# Patient Record
Sex: FEMALE | Race: WHITE | NOT HISPANIC OR LATINO
[De-identification: names, ages, dates, MRNs, and addresses within clinical notes are randomized per-mention and may not be internally consistent; named-entity substitution may affect disease eponyms.]

---

## 2018-01-26 ENCOUNTER — TRANSCRIPTION ENCOUNTER (OUTPATIENT)
Age: 83
End: 2018-01-26

## 2019-05-27 ENCOUNTER — EMERGENCY (EMERGENCY)
Facility: HOSPITAL | Age: 84
LOS: 1 days | Discharge: HOME | End: 2019-05-27
Attending: EMERGENCY MEDICINE | Admitting: EMERGENCY MEDICINE
Payer: MEDICARE

## 2019-05-27 VITALS
HEART RATE: 64 BPM | OXYGEN SATURATION: 95 % | SYSTOLIC BLOOD PRESSURE: 153 MMHG | WEIGHT: 134.92 LBS | DIASTOLIC BLOOD PRESSURE: 76 MMHG | RESPIRATION RATE: 16 BRPM | HEIGHT: 61 IN | TEMPERATURE: 99 F

## 2019-05-27 VITALS
OXYGEN SATURATION: 96 % | RESPIRATION RATE: 16 BRPM | TEMPERATURE: 99 F | HEART RATE: 63 BPM | DIASTOLIC BLOOD PRESSURE: 82 MMHG | SYSTOLIC BLOOD PRESSURE: 166 MMHG

## 2019-05-27 PROBLEM — Z00.00 ENCOUNTER FOR PREVENTIVE HEALTH EXAMINATION: Status: ACTIVE | Noted: 2019-05-27

## 2019-05-27 PROCEDURE — 90715 TDAP VACCINE 7 YRS/> IM: CPT

## 2019-05-27 PROCEDURE — 73562 X-RAY EXAM OF KNEE 3: CPT | Mod: 26,RT

## 2019-05-27 PROCEDURE — 99285 EMERGENCY DEPT VISIT HI MDM: CPT | Mod: 25

## 2019-05-27 PROCEDURE — 73130 X-RAY EXAM OF HAND: CPT | Mod: 26,LT

## 2019-05-27 PROCEDURE — 70450 CT HEAD/BRAIN W/O DYE: CPT | Mod: 26

## 2019-05-27 PROCEDURE — 71101 X-RAY EXAM UNILAT RIBS/CHEST: CPT

## 2019-05-27 PROCEDURE — 70450 CT HEAD/BRAIN W/O DYE: CPT

## 2019-05-27 PROCEDURE — 90471 IMMUNIZATION ADMIN: CPT

## 2019-05-27 PROCEDURE — 71101 X-RAY EXAM UNILAT RIBS/CHEST: CPT | Mod: 26

## 2019-05-27 PROCEDURE — 99284 EMERGENCY DEPT VISIT MOD MDM: CPT | Mod: 25

## 2019-05-27 PROCEDURE — 12013 RPR F/E/E/N/L/M 2.6-5.0 CM: CPT

## 2019-05-27 PROCEDURE — 73130 X-RAY EXAM OF HAND: CPT

## 2019-05-27 PROCEDURE — 73562 X-RAY EXAM OF KNEE 3: CPT

## 2019-05-27 RX ORDER — ACETAMINOPHEN 500 MG
650 TABLET ORAL ONCE
Refills: 0 | Status: COMPLETED | OUTPATIENT
Start: 2019-05-27 | End: 2019-05-27

## 2019-05-27 RX ORDER — TETANUS AND DIPHTHERIA TOXOIDS ADSORBED 2; 2 [LF]/.5ML; [LF]/.5ML
0.5 INJECTION INTRAMUSCULAR ONCE
Refills: 0 | Status: DISCONTINUED | OUTPATIENT
Start: 2019-05-27 | End: 2019-05-27

## 2019-05-27 RX ORDER — TETANUS TOXOID, REDUCED DIPHTHERIA TOXOID AND ACELLULAR PERTUSSIS VACCINE, ADSORBED 5; 2.5; 8; 8; 2.5 [IU]/.5ML; [IU]/.5ML; UG/.5ML; UG/.5ML; UG/.5ML
0.5 SUSPENSION INTRAMUSCULAR ONCE
Refills: 0 | Status: COMPLETED | OUTPATIENT
Start: 2019-05-27 | End: 2019-05-27

## 2019-05-27 RX ADMIN — Medication 650 MILLIGRAM(S): at 19:54

## 2019-05-27 RX ADMIN — Medication 650 MILLIGRAM(S): at 19:26

## 2019-05-27 RX ADMIN — TETANUS TOXOID, REDUCED DIPHTHERIA TOXOID AND ACELLULAR PERTUSSIS VACCINE, ADSORBED 0.5 MILLILITER(S): 5; 2.5; 8; 8; 2.5 SUSPENSION INTRAMUSCULAR at 18:31

## 2019-05-27 NOTE — ED ADULT TRIAGE NOTE - CHIEF COMPLAINT QUOTE
Per son the patient fell going up a small step and sustained a laceration to the left forehead from her glasses." Per son the patient fell going up a small step and sustained a laceration to the left forehead from her glasses and she also has pain to the left hand."

## 2019-05-27 NOTE — ED PROVIDER NOTE - ENMT, MLM
Airway patent, Nasal mucosa clear. Mouth with normal mucosa. Throat has no vesicles, no oropharyngeal exudates and uvula is midline.  3 cm laceration left eyebrow, no bony deformity,, PERRL, EOMI, neck supple, nontender,

## 2019-05-27 NOTE — ED PROVIDER NOTE - PROGRESS NOTE DETAILS
discussed results with family and pt, understand official xr reads will be done in the morning, pt is ambulatory, ready for discharge

## 2019-05-27 NOTE — ED ADULT NURSE NOTE - NSIMPLEMENTINTERV_GEN_ALL_ED
Implemented All Fall Risk Interventions:  [place] to call system. Call bell, personal items and telephone within reach. Instruct patient to call for assistance. Room bathroom lighting operational. Non-slip footwear when patient is off stretcher. Physically safe environment: no spills, clutter or unnecessary equipment. Stretcher in lowest position, wheels locked, appropriate side rails in place. Provide visual cue, wrist band, yellow gown, etc. Monitor gait and stability. Monitor for mental status changes and reorient to person, place, and time. Review medications for side effects contributing to fall risk. Reinforce activity limits and safety measures with patient and family.

## 2019-05-27 NOTE — ED ADULT NURSE NOTE - OBJECTIVE STATEMENT
patient fell and hit her head, a laceration on left eyebrow and bleeding stopped, also c/o left wrist pain, MD at bedside, denies chest pain or patient fell and hit her head, a laceration on left eyebrow and bleeding stopped, also c/o left wrist pain, MD at bedside, denies chest pain or sob or headache, will continue to monitor.   Pt speaks [place], Talib Solorio, Son is translating for patient

## 2019-05-27 NOTE — ED ADULT NURSE NOTE - CHIEF COMPLAINT QUOTE
Per son the patient fell going up a small step and sustained a laceration to the left forehead from her glasses and she also has pain to the left hand."

## 2019-05-27 NOTE — ED PROVIDER NOTE - CLINICAL SUMMARY MEDICAL DECISION MAKING FREE TEXT BOX
dementia pt fell left eyebrow laceration, multiple bony tenderness, plan - suture, CT brain, XR ribs, hand, knee.

## 2019-05-27 NOTE — ED PROVIDER NOTE - CARDIAC, MLM
Normal rate, regular rhythm.  Heart sounds S1, S2.  tenderness left lateral ribcage, no boy deformity, no stepoff

## 2019-05-27 NOTE — ED PROVIDER NOTE - MUSCULOSKELETAL, MLM
Spine appears normal, range of motion is not limited, no muscle or joint tenderness,+ minor swelling and tenderness left hand, no bony deformity, FROM intact, +minor tenderness right knee no bony deformity, FROM intact

## 2020-11-02 ENCOUNTER — EMERGENCY (EMERGENCY)
Facility: HOSPITAL | Age: 85
LOS: 1 days | End: 2020-11-02
Attending: EMERGENCY MEDICINE | Admitting: EMERGENCY MEDICINE
Payer: MEDICARE

## 2020-11-02 VITALS
DIASTOLIC BLOOD PRESSURE: 77 MMHG | SYSTOLIC BLOOD PRESSURE: 168 MMHG | HEART RATE: 92 BPM | RESPIRATION RATE: 18 BRPM | TEMPERATURE: 98 F | OXYGEN SATURATION: 95 %

## 2020-11-02 VITALS
TEMPERATURE: 98 F | HEIGHT: 61 IN | SYSTOLIC BLOOD PRESSURE: 146 MMHG | RESPIRATION RATE: 18 BRPM | OXYGEN SATURATION: 96 % | WEIGHT: 130.07 LBS | HEART RATE: 78 BPM | DIASTOLIC BLOOD PRESSURE: 74 MMHG

## 2020-11-02 PROCEDURE — 99284 EMERGENCY DEPT VISIT MOD MDM: CPT

## 2020-11-02 PROCEDURE — 73030 X-RAY EXAM OF SHOULDER: CPT

## 2020-11-02 PROCEDURE — 70450 CT HEAD/BRAIN W/O DYE: CPT | Mod: 26

## 2020-11-02 PROCEDURE — 73060 X-RAY EXAM OF HUMERUS: CPT | Mod: 26,LT

## 2020-11-02 PROCEDURE — 70450 CT HEAD/BRAIN W/O DYE: CPT

## 2020-11-02 PROCEDURE — 73030 X-RAY EXAM OF SHOULDER: CPT | Mod: 26,LT

## 2020-11-02 PROCEDURE — 73060 X-RAY EXAM OF HUMERUS: CPT

## 2020-11-02 PROCEDURE — 72125 CT NECK SPINE W/O DYE: CPT

## 2020-11-02 PROCEDURE — 72125 CT NECK SPINE W/O DYE: CPT | Mod: 26

## 2020-11-02 PROCEDURE — 99284 EMERGENCY DEPT VISIT MOD MDM: CPT | Mod: 25

## 2020-11-02 RX ORDER — OXYCODONE AND ACETAMINOPHEN 5; 325 MG/1; MG/1
1 TABLET ORAL ONCE
Refills: 0 | Status: DISCONTINUED | OUTPATIENT
Start: 2020-11-02 | End: 2020-11-02

## 2020-11-02 RX ORDER — METOPROLOL TARTRATE 50 MG
50 TABLET ORAL
Qty: 0 | Refills: 0 | DISCHARGE

## 2020-11-02 RX ADMIN — OXYCODONE AND ACETAMINOPHEN 1 TABLET(S): 5; 325 TABLET ORAL at 11:44

## 2020-11-02 RX ADMIN — OXYCODONE AND ACETAMINOPHEN 1 TABLET(S): 5; 325 TABLET ORAL at 12:44

## 2020-11-02 NOTE — ED PROVIDER NOTE - OBJECTIVE STATEMENT
#[id number]  pt bib son for left shoulder pain s/p slip and fall onto left side this am. pt's son came to house helped her up, and she was able to bear weight on legs. no loc, ha, neck or back pain, weakness, numbness, cp, sob, abd pain, leg pain.  pmd - delchandrika

## 2020-11-02 NOTE — ED ADULT NURSE NOTE - OBJECTIVE STATEMENT
Amb to ED with her son who states when he arrived @ her house he found her facedown on floor. Pt speaks Farsi- used  Tanya # [id number]. Pt states she slipped & fell on her left shoulder. C/O tenderness over left shoulder-unable to raise her arm. Pulses + Denies any other symtoms- no dizziness or headache. Freely moving bilat legs & rt arm.  Bed alarm placed on bed

## 2020-11-02 NOTE — ED ADULT TRIAGE NOTE - CHIEF COMPLAINT QUOTE
" I found her face down on the floor " I found her face down on the floor, her left shoulder hurts, she is on Eliquis "

## 2020-11-02 NOTE — ED PROVIDER NOTE - CARE PROVIDER_API CALL
Yousif Oliveros  ORTHOPAEDIC SPORTS MEDICINE  [address], [address]  [place] [address]  Phone: [phone]  Fax: [phone]  Follow Up Time: 1-3 Days

## 2020-11-02 NOTE — ED PROVIDER NOTE - PATIENT PORTAL LINK FT
You can access the FollowMyHealth Patient Portal offered by [facility] by registering at the following website: [email or web address]. By joining [employer]’s FollowMyHealth portal, you will also be able to view your health information using other applications (apps) compatible with our system.

## 2021-01-22 ENCOUNTER — EMERGENCY (EMERGENCY)
Facility: HOSPITAL | Age: 86
LOS: 1 days | Discharge: HOME | End: 2021-01-22
Attending: EMERGENCY MEDICINE | Admitting: EMERGENCY MEDICINE
Payer: MEDICARE

## 2021-01-22 VITALS
SYSTOLIC BLOOD PRESSURE: 183 MMHG | HEART RATE: 75 BPM | DIASTOLIC BLOOD PRESSURE: 94 MMHG | TEMPERATURE: 98 F | RESPIRATION RATE: 18 BRPM | OXYGEN SATURATION: 95 %

## 2021-01-22 VITALS
RESPIRATION RATE: 18 BRPM | WEIGHT: 134.92 LBS | HEART RATE: 72 BPM | TEMPERATURE: 98 F | HEIGHT: 61 IN | DIASTOLIC BLOOD PRESSURE: 101 MMHG | SYSTOLIC BLOOD PRESSURE: 179 MMHG | OXYGEN SATURATION: 95 %

## 2021-01-22 PROBLEM — I48.91 UNSPECIFIED ATRIAL FIBRILLATION: Chronic | Status: ACTIVE | Noted: 2020-11-02

## 2021-01-22 PROCEDURE — 73030 X-RAY EXAM OF SHOULDER: CPT

## 2021-01-22 PROCEDURE — 72125 CT NECK SPINE W/O DYE: CPT

## 2021-01-22 PROCEDURE — 70450 CT HEAD/BRAIN W/O DYE: CPT

## 2021-01-22 PROCEDURE — 73562 X-RAY EXAM OF KNEE 3: CPT | Mod: 26,RT

## 2021-01-22 PROCEDURE — 99284 EMERGENCY DEPT VISIT MOD MDM: CPT | Mod: 25

## 2021-01-22 PROCEDURE — 73030 X-RAY EXAM OF SHOULDER: CPT | Mod: 26,LT

## 2021-01-22 PROCEDURE — 70450 CT HEAD/BRAIN W/O DYE: CPT | Mod: 26,MH

## 2021-01-22 PROCEDURE — 72125 CT NECK SPINE W/O DYE: CPT | Mod: 26,MH

## 2021-01-22 PROCEDURE — 99284 EMERGENCY DEPT VISIT MOD MDM: CPT

## 2021-01-22 PROCEDURE — 73562 X-RAY EXAM OF KNEE 3: CPT

## 2021-01-22 RX ORDER — ARIPIPRAZOLE 15 MG/1
7 TABLET ORAL
Qty: 0 | Refills: 0 | DISCHARGE

## 2021-01-22 RX ORDER — MULTIVIT-MIN/FERROUS GLUCONATE 9 MG/15 ML
1 LIQUID (ML) ORAL
Qty: 0 | Refills: 0 | DISCHARGE

## 2021-01-22 RX ORDER — METOPROLOL TARTRATE 50 MG
1 TABLET ORAL
Qty: 0 | Refills: 0 | DISCHARGE

## 2021-01-22 RX ORDER — EXEMESTANE 25 MG/1
1 TABLET, SUGAR COATED ORAL
Qty: 0 | Refills: 0 | DISCHARGE

## 2021-01-22 RX ORDER — ALENDRONATE SODIUM 70 MG/1
1 TABLET ORAL
Qty: 0 | Refills: 0 | DISCHARGE

## 2021-01-22 RX ORDER — OXYBUTYNIN CHLORIDE 5 MG
1 TABLET ORAL
Qty: 0 | Refills: 0 | DISCHARGE

## 2021-01-22 RX ORDER — ATORVASTATIN CALCIUM 80 MG/1
1 TABLET, FILM COATED ORAL
Qty: 0 | Refills: 0 | DISCHARGE

## 2021-01-22 RX ORDER — SERTRALINE 25 MG/1
125 TABLET, FILM COATED ORAL
Qty: 0 | Refills: 0 | DISCHARGE

## 2021-01-22 RX ORDER — APIXABAN 2.5 MG/1
1 TABLET, FILM COATED ORAL
Qty: 0 | Refills: 0 | DISCHARGE

## 2021-01-22 RX ORDER — OMEPRAZOLE 10 MG/1
1 CAPSULE, DELAYED RELEASE ORAL
Qty: 0 | Refills: 0 | DISCHARGE

## 2021-01-22 NOTE — ED ADULT NURSE NOTE - NEURO ASSESSMENT
Anesthesia Start and Stop Event Times     Date Time Event    8/5/2019 0842 Ready for Procedure     0900 Anesthesia Start     1058 Anesthesia Stop        Responsible Staff  08/05/19    Name Role Begin End    Timothy Gibson M.D. Anesth 0900 1058        Preop Diagnosis (Free Text):  Pre-op Diagnosis     Left Hip Avascular Necrosis with Prior Surgery        Preop Diagnosis (Codes):    Post op Diagnosis  Avascular necrosis of hip, left (HCC)      Premium Reason  Non-Premium    Comments:                                                                       
- - -

## 2021-01-22 NOTE — ED ADULT NURSE NOTE - OBJECTIVE STATEMENT
as per  #[id number] Gina; pt is not answering questions correctly; pt will not communicate with . as per pt's son pt has dementia. pt winces and moans in pain when her left arm is touched. as per son; she seems to have more pain. pt s/p fall; swelling noted above left eye. as per pt's son; he heard patient get OOB, then heard her fall

## 2021-01-22 NOTE — ED ADULT NURSE NOTE - NSIMPLEMENTINTERV_GEN_ALL_ED
Implemented All Fall with Harm Risk Interventions:  [place] to call system. Call bell, personal items and telephone within reach. Instruct patient to call for assistance. Room bathroom lighting operational. Non-slip footwear when patient is off stretcher. Physically safe environment: no spills, clutter or unnecessary equipment. Stretcher in lowest position, wheels locked, appropriate side rails in place. Provide visual cue, wrist band, yellow gown, etc. Monitor gait and stability. Monitor for mental status changes and reorient to person, place, and time. Review medications for side effects contributing to fall risk. Reinforce activity limits and safety measures with patient and family. Provide visual clues: red socks.

## 2021-01-22 NOTE — ED PROVIDER NOTE - SKIN, MLM
Skin normal color for race, warm, dry and intact. No evidence of rash. Mild ecchymosis left periorbital area

## 2021-01-22 NOTE — ED PROVIDER NOTE - CLINICAL SUMMARY MEDICAL DECISION MAKING FREE TEXT BOX
Patient with fall at home. Hit head. On Eliquis. Will get head CT and imaging of painful areas. If no significant, acute findings, patient can be d/c'ed

## 2021-01-22 NOTE — ED PROVIDER NOTE - PATIENT PORTAL LINK FT
You can access the FollowMyHealth Patient Portal offered by [facility] by registering at the following website: [email or web address]. By joining [employer]’s FollowMyHealth portal, you will also be able to view your health information using other applications (apps) compatible with our system.

## 2021-01-22 NOTE — ED PROVIDER NOTE - OBJECTIVE STATEMENT
Patient got out of bed to go to the bathroom and fell. Son heard her fall. No known LOC. Noted to have hit head/face. No other obvious injury noted, though patient may have increased pain in left shoulder after recent fracture. Patient ambulatory since the fall

## 2021-01-22 NOTE — ED ADULT NURSE REASSESSMENT NOTE - NS ED NURSE REASSESS COMMENT FT1
pt not fully cooperative in radiology. md aware of all results. pt's son informed of findings and of d/c instructions

## 2021-04-12 ENCOUNTER — INPATIENT (INPATIENT)
Facility: HOSPITAL | Age: 86
LOS: 0 days | Discharge: LEFT BEFORE DISCHARGE | DRG: 392 | End: 2021-04-12
Attending: HOSPITALIST | Admitting: HOSPITALIST
Payer: MEDICARE

## 2021-04-12 VITALS
OXYGEN SATURATION: 94 % | HEART RATE: 85 BPM | TEMPERATURE: 99 F | SYSTOLIC BLOOD PRESSURE: 167 MMHG | RESPIRATION RATE: 18 BRPM | DIASTOLIC BLOOD PRESSURE: 68 MMHG

## 2021-04-12 VITALS
SYSTOLIC BLOOD PRESSURE: 97 MMHG | RESPIRATION RATE: 18 BRPM | DIASTOLIC BLOOD PRESSURE: 59 MMHG | TEMPERATURE: 100 F | HEIGHT: 60 IN | OXYGEN SATURATION: 95 % | WEIGHT: 130.07 LBS | HEART RATE: 104 BPM

## 2021-04-12 DIAGNOSIS — R10.9 UNSPECIFIED ABDOMINAL PAIN: ICD-10-CM

## 2021-04-12 PROBLEM — F03.90 UNSPECIFIED DEMENTIA WITHOUT BEHAVIORAL DISTURBANCE: Chronic | Status: ACTIVE | Noted: 2021-01-22

## 2021-04-12 LAB
ALBUMIN SERPL ELPH-MCNC: 3.7 G/DL — SIGNIFICANT CHANGE UP (ref 3.3–5)
ALP SERPL-CCNC: 148 U/L — HIGH (ref 30–120)
ALT FLD-CCNC: 31 U/L DA — SIGNIFICANT CHANGE UP (ref 10–60)
ANION GAP SERPL CALC-SCNC: 13 MMOL/L — SIGNIFICANT CHANGE UP (ref 5–17)
APPEARANCE UR: CLEAR — SIGNIFICANT CHANGE UP
APTT BLD: 38.9 SEC — HIGH (ref 27.5–35.5)
AST SERPL-CCNC: 24 U/L — SIGNIFICANT CHANGE UP (ref 10–40)
B PERT DNA SPEC QL NAA+PROBE: SIGNIFICANT CHANGE UP
BACTERIA # UR AUTO: NEGATIVE — SIGNIFICANT CHANGE UP
BASOPHILS # BLD AUTO: 0.05 K/UL — SIGNIFICANT CHANGE UP (ref 0–0.2)
BASOPHILS NFR BLD AUTO: 0.5 % — SIGNIFICANT CHANGE UP (ref 0–2)
BILIRUB SERPL-MCNC: 0.7 MG/DL — SIGNIFICANT CHANGE UP (ref 0.2–1.2)
BILIRUB UR-MCNC: NEGATIVE — SIGNIFICANT CHANGE UP
BUN SERPL-MCNC: 15 MG/DL — SIGNIFICANT CHANGE UP (ref 7–23)
C PNEUM DNA SPEC QL NAA+PROBE: SIGNIFICANT CHANGE UP
CALCIUM SERPL-MCNC: 9.8 MG/DL — SIGNIFICANT CHANGE UP (ref 8.4–10.5)
CHLORIDE SERPL-SCNC: 101 MMOL/L — SIGNIFICANT CHANGE UP (ref 96–108)
CO2 SERPL-SCNC: 26 MMOL/L — SIGNIFICANT CHANGE UP (ref 22–31)
COLOR SPEC: YELLOW — SIGNIFICANT CHANGE UP
CREAT SERPL-MCNC: 0.92 MG/DL — SIGNIFICANT CHANGE UP (ref 0.5–1.3)
DIFF PNL FLD: ABNORMAL
EOSINOPHIL # BLD AUTO: 0.05 K/UL — SIGNIFICANT CHANGE UP (ref 0–0.5)
EOSINOPHIL NFR BLD AUTO: 0.5 % — SIGNIFICANT CHANGE UP (ref 0–6)
EPI CELLS # UR: SIGNIFICANT CHANGE UP
FLUAV H1 2009 PAND RNA SPEC QL NAA+PROBE: SIGNIFICANT CHANGE UP
FLUAV H1 RNA SPEC QL NAA+PROBE: SIGNIFICANT CHANGE UP
FLUAV H3 RNA SPEC QL NAA+PROBE: SIGNIFICANT CHANGE UP
FLUAV SUBTYP SPEC NAA+PROBE: SIGNIFICANT CHANGE UP
FLUBV RNA SPEC QL NAA+PROBE: SIGNIFICANT CHANGE UP
GLUCOSE SERPL-MCNC: 200 MG/DL — HIGH (ref 70–99)
GLUCOSE UR QL: NEGATIVE MG/DL — SIGNIFICANT CHANGE UP
HADV DNA SPEC QL NAA+PROBE: SIGNIFICANT CHANGE UP
HCOV PNL SPEC NAA+PROBE: SIGNIFICANT CHANGE UP
HCT VFR BLD CALC: 45.8 % — HIGH (ref 34.5–45)
HGB BLD-MCNC: 15.2 G/DL — SIGNIFICANT CHANGE UP (ref 11.5–15.5)
HMPV RNA SPEC QL NAA+PROBE: SIGNIFICANT CHANGE UP
HPIV1 RNA SPEC QL NAA+PROBE: SIGNIFICANT CHANGE UP
HPIV2 RNA SPEC QL NAA+PROBE: SIGNIFICANT CHANGE UP
HPIV3 RNA SPEC QL NAA+PROBE: SIGNIFICANT CHANGE UP
HPIV4 RNA SPEC QL NAA+PROBE: SIGNIFICANT CHANGE UP
IMM GRANULOCYTES NFR BLD AUTO: 0.6 % — SIGNIFICANT CHANGE UP (ref 0–1.5)
INR BLD: 1.38 RATIO — HIGH (ref 0.88–1.16)
KETONES UR-MCNC: ABNORMAL
LACTATE SERPL-SCNC: 1.1 MMOL/L — SIGNIFICANT CHANGE UP (ref 0.7–2)
LACTATE SERPL-SCNC: 2.2 MMOL/L — HIGH (ref 0.7–2)
LEUKOCYTE ESTERASE UR-ACNC: ABNORMAL
LYMPHOCYTES # BLD AUTO: 1.54 K/UL — SIGNIFICANT CHANGE UP (ref 1–3.3)
LYMPHOCYTES # BLD AUTO: 14.1 % — SIGNIFICANT CHANGE UP (ref 13–44)
MCHC RBC-ENTMCNC: 28.5 PG — SIGNIFICANT CHANGE UP (ref 27–34)
MCHC RBC-ENTMCNC: 33.2 GM/DL — SIGNIFICANT CHANGE UP (ref 32–36)
MCV RBC AUTO: 85.8 FL — SIGNIFICANT CHANGE UP (ref 80–100)
MONOCYTES # BLD AUTO: 0.73 K/UL — SIGNIFICANT CHANGE UP (ref 0–0.9)
MONOCYTES NFR BLD AUTO: 6.7 % — SIGNIFICANT CHANGE UP (ref 2–14)
NEUTROPHILS # BLD AUTO: 8.51 K/UL — HIGH (ref 1.8–7.4)
NEUTROPHILS NFR BLD AUTO: 77.6 % — HIGH (ref 43–77)
NITRITE UR-MCNC: NEGATIVE — SIGNIFICANT CHANGE UP
NRBC # BLD: 0 /100 WBCS — SIGNIFICANT CHANGE UP (ref 0–0)
PH UR: 6 — SIGNIFICANT CHANGE UP (ref 5–8)
PLATELET # BLD AUTO: 298 K/UL — SIGNIFICANT CHANGE UP (ref 150–400)
POTASSIUM SERPL-MCNC: 3.2 MMOL/L — LOW (ref 3.5–5.3)
POTASSIUM SERPL-SCNC: 3.2 MMOL/L — LOW (ref 3.5–5.3)
PROT SERPL-MCNC: 7.3 G/DL — SIGNIFICANT CHANGE UP (ref 6–8.3)
PROT UR-MCNC: 100 MG/DL
PROTHROM AB SERPL-ACNC: 16.5 SEC — HIGH (ref 10.6–13.6)
RAPID RVP RESULT: SIGNIFICANT CHANGE UP
RBC # BLD: 5.34 M/UL — HIGH (ref 3.8–5.2)
RBC # FLD: 13 % — SIGNIFICANT CHANGE UP (ref 10.3–14.5)
RBC CASTS # UR COMP ASSIST: ABNORMAL /HPF (ref 0–4)
RSV RNA SPEC QL NAA+PROBE: SIGNIFICANT CHANGE UP
RV+EV RNA SPEC QL NAA+PROBE: SIGNIFICANT CHANGE UP
SARS-COV-2 RNA SPEC QL NAA+PROBE: SIGNIFICANT CHANGE UP
SODIUM SERPL-SCNC: 140 MMOL/L — SIGNIFICANT CHANGE UP (ref 135–145)
SP GR SPEC: 1.01 — SIGNIFICANT CHANGE UP (ref 1.01–1.02)
UROBILINOGEN FLD QL: 4 MG/DL
WBC # BLD: 10.95 K/UL — HIGH (ref 3.8–10.5)
WBC # FLD AUTO: 10.95 K/UL — HIGH (ref 3.8–10.5)
WBC UR QL: SIGNIFICANT CHANGE UP

## 2021-04-12 PROCEDURE — 72125 CT NECK SPINE W/O DYE: CPT

## 2021-04-12 PROCEDURE — 85610 PROTHROMBIN TIME: CPT

## 2021-04-12 PROCEDURE — G1004: CPT

## 2021-04-12 PROCEDURE — 74177 CT ABD & PELVIS W/CONTRAST: CPT

## 2021-04-12 PROCEDURE — 70450 CT HEAD/BRAIN W/O DYE: CPT

## 2021-04-12 PROCEDURE — 36415 COLL VENOUS BLD VENIPUNCTURE: CPT

## 2021-04-12 PROCEDURE — 70450 CT HEAD/BRAIN W/O DYE: CPT | Mod: 26,MH

## 2021-04-12 PROCEDURE — 74177 CT ABD & PELVIS W/CONTRAST: CPT | Mod: 26,ME

## 2021-04-12 PROCEDURE — 93005 ELECTROCARDIOGRAM TRACING: CPT

## 2021-04-12 PROCEDURE — 72125 CT NECK SPINE W/O DYE: CPT | Mod: 26,MH

## 2021-04-12 PROCEDURE — 71260 CT THORAX DX C+: CPT | Mod: 26,MA

## 2021-04-12 PROCEDURE — 99285 EMERGENCY DEPT VISIT HI MDM: CPT

## 2021-04-12 PROCEDURE — 83605 ASSAY OF LACTIC ACID: CPT

## 2021-04-12 PROCEDURE — 87086 URINE CULTURE/COLONY COUNT: CPT

## 2021-04-12 PROCEDURE — 76705 ECHO EXAM OF ABDOMEN: CPT

## 2021-04-12 PROCEDURE — 99284 EMERGENCY DEPT VISIT MOD MDM: CPT | Mod: CS

## 2021-04-12 PROCEDURE — 71260 CT THORAX DX C+: CPT

## 2021-04-12 PROCEDURE — 85730 THROMBOPLASTIN TIME PARTIAL: CPT

## 2021-04-12 PROCEDURE — 0225U NFCT DS DNA&RNA 21 SARSCOV2: CPT

## 2021-04-12 PROCEDURE — 81001 URINALYSIS AUTO W/SCOPE: CPT

## 2021-04-12 PROCEDURE — 85025 COMPLETE CBC W/AUTO DIFF WBC: CPT

## 2021-04-12 PROCEDURE — 87040 BLOOD CULTURE FOR BACTERIA: CPT

## 2021-04-12 PROCEDURE — 71045 X-RAY EXAM CHEST 1 VIEW: CPT

## 2021-04-12 PROCEDURE — 71045 X-RAY EXAM CHEST 1 VIEW: CPT | Mod: 26

## 2021-04-12 PROCEDURE — 80053 COMPREHEN METABOLIC PANEL: CPT

## 2021-04-12 PROCEDURE — 76705 ECHO EXAM OF ABDOMEN: CPT | Mod: 26

## 2021-04-12 RX ORDER — METOPROLOL TARTRATE 50 MG
5 TABLET ORAL ONCE
Refills: 0 | Status: COMPLETED | OUTPATIENT
Start: 2021-04-12 | End: 2021-04-12

## 2021-04-12 RX ORDER — POTASSIUM CHLORIDE 20 MEQ
40 PACKET (EA) ORAL ONCE
Refills: 0 | Status: COMPLETED | OUTPATIENT
Start: 2021-04-12 | End: 2021-04-12

## 2021-04-12 RX ORDER — PIPERACILLIN AND TAZOBACTAM 4; .5 G/20ML; G/20ML
3.38 INJECTION, POWDER, LYOPHILIZED, FOR SOLUTION INTRAVENOUS ONCE
Refills: 0 | Status: COMPLETED | OUTPATIENT
Start: 2021-04-12 | End: 2021-04-12

## 2021-04-12 RX ORDER — ACETAMINOPHEN 500 MG
650 TABLET ORAL ONCE
Refills: 0 | Status: COMPLETED | OUTPATIENT
Start: 2021-04-12 | End: 2021-04-12

## 2021-04-12 RX ORDER — SODIUM CHLORIDE 9 MG/ML
1850 INJECTION INTRAMUSCULAR; INTRAVENOUS; SUBCUTANEOUS ONCE
Refills: 0 | Status: COMPLETED | OUTPATIENT
Start: 2021-04-12 | End: 2021-04-12

## 2021-04-12 RX ADMIN — Medication 5 MILLIGRAM(S): at 18:16

## 2021-04-12 RX ADMIN — Medication 40 MILLIEQUIVALENT(S): at 18:16

## 2021-04-12 RX ADMIN — SODIUM CHLORIDE 1850 MILLILITER(S): 9 INJECTION INTRAMUSCULAR; INTRAVENOUS; SUBCUTANEOUS at 14:35

## 2021-04-12 RX ADMIN — Medication 650 MILLIGRAM(S): at 14:36

## 2021-04-12 RX ADMIN — PIPERACILLIN AND TAZOBACTAM 200 GRAM(S): 4; .5 INJECTION, POWDER, LYOPHILIZED, FOR SOLUTION INTRAVENOUS at 18:15

## 2021-04-12 RX ADMIN — Medication 650 MILLIGRAM(S): at 15:06

## 2021-04-12 NOTE — ED ADULT TRIAGE NOTE - CHIEF COMPLAINT QUOTE
As per son, she fell 10 days ago, she has been complaining of right sided back, lower back and lower abdominal pain since fall "

## 2021-04-12 NOTE — ED PROVIDER NOTE - MUSCULOSKELETAL, MLM
No foot drop. Spine appears normal, range of motion is not limited, no muscle or joint tenderness. no foot drop

## 2021-04-12 NOTE — ED PROVIDER NOTE - OBJECTIVE STATEMENT
86 y/o F PMHX dementia, afib on Eliquis, and L breast CA presents to ED c/o back pain. Pt is a poor historian 2/2 dementia. Pt does not known why she is here. pt not answering questions appropriately per . Will contact son for further history. Per son, pt fell 10 days ago, has been complaining of R sided back pain, lower back pain, and lower abd pain since the fall. 88 y/o F PMHX dementia, afib on Eliquis, and L breast CA presents to ED c/o back pain. Pt is a poor historian 2/2 dementia. Pt does not known why she is here. pt not answering questions appropriately per . Will contact son for further history. Per son, pt fell 10 days ago, has been complaining of R sided back pain, lower back pain, and lower abd pain since the fall. Pt attempted to get an OP CT abd, however, pt did not hold still and had a nondiagnostic CT. PCP: Dr. Villatoro. via Farsi xlator  86 y/o F PMHX dementia, afib on Eliquis, and L breast CA presents to ED c/o back pain. Pt is a poor historian 2/2 dementia. Pt does not known why she is here. pt not answering questions appropriately per . Will contact son for further history. Per son, pt fell 10 days ago, has been complaining of R sided back pain, and lower abd pain since the fall. Pt attempted to get an Outpatient CT abd, however, pt did not hold still and had a nondiagnostic CT. PCP: Dr. Villatoro.

## 2021-04-12 NOTE — ED PROVIDER NOTE - CLINICAL SUMMARY MEDICAL DECISION MAKING FREE TEXT BOX
Pt w/ R back and lower abd pain s/p fall x10 days ago, febrile in ED. Plan EKG, XR, CT, labs, IV fluids, tylenol.

## 2021-04-12 NOTE — ED PROVIDER NOTE - PATIENT PORTAL LINK FT
You can access the FollowMyHealth Patient Portal offered by [facility] by registering at the following website: [email or web address]. By joining [employer]s FollowMyHealth portal, you will also be able to view your health information using other applications (apps) compatible with our system.

## 2021-04-12 NOTE — ED ADULT NURSE NOTE - NSSUHOSCREENINGYN_ED_ALL_ED
Lives w/ daughter at home  ADLs ind, ambulates ind  Social Etoh use, current smoker approx 4cigs/day x60 years.   (+) flu vaccine [id number] season No - the patient is unable to be screened due to medical condition

## 2021-04-12 NOTE — ED PROVIDER NOTE - CARE PROVIDER_API CALL
Macho Horner)  Surgery  [address] [place] [address], [address]  [place] [address]  Phone: [phone]  Fax: [phone]  Follow Up Time: 1-3 days

## 2021-04-12 NOTE — ED PROVIDER NOTE - PROGRESS NOTE DETAILS
son wants to sign mother out AMA. Had an at length discussion with patient's son.  He wishes to sign out patient at this time.  He understands that they are leaving against medical advice despite the risk of missing a potentially serious diagnosis which may lead to injury, disability and/or death.  I discussed with the patient's son which tests would need to be performed and what type of monitoring would be necessary for the patient as well.  I was unable to convince patient's son to have patient to stay for further workup.  He was given an opportunity to ask any questions as well.   The patient's son demonstrates understanding of all risks, is awake and alert and demonstrates competence to make medical decisions.  He understands that they may return at any time if desired. Discussed the importance of close, prompt medical follow-up.

## 2021-04-12 NOTE — ED ADULT NURSE NOTE - OBJECTIVE STATEMENT
pt is from [facility] living, c/o fall, patient has hx of dementia, MD at bedside and Pt is in no acute distress. IV accessed and tolerating, covered with dressing to secure, . has temp of 100.4 F rectally, MD aware, will continue to monitor.

## 2021-04-12 NOTE — ED PROVIDER NOTE - CARE PLAN
Principal Discharge DX:	Right sided abdominal pain  Secondary Diagnosis:	Gallstones  Secondary Diagnosis:	Rapid atrial fibrillation

## 2021-04-13 LAB
CULTURE RESULTS: SIGNIFICANT CHANGE UP
SPECIMEN SOURCE: SIGNIFICANT CHANGE UP

## 2021-04-17 LAB
CULTURE RESULTS: SIGNIFICANT CHANGE UP
CULTURE RESULTS: SIGNIFICANT CHANGE UP
SPECIMEN SOURCE: SIGNIFICANT CHANGE UP
SPECIMEN SOURCE: SIGNIFICANT CHANGE UP

## 2023-06-29 NOTE — ED ADULT TRIAGE NOTE - BP NONINVASIVE DIASTOLIC (MM HG)
76 Consent: Written consent was obtained and risks were reviewed including but not limited to scarring, infection, bleeding, scabbing, incomplete removal, nerve damage and allergy to anesthesia.

## 2024-02-26 NOTE — ED ADULT TRIAGE NOTE - BSA (M2)
1.55 I personally evaluated the patient. I reviewed the Resident’s or Physician Assistant’s note (as assigned above), and agree with the findings and plan except as documented in my note.
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
